# Patient Record
Sex: MALE | Race: BLACK OR AFRICAN AMERICAN | NOT HISPANIC OR LATINO | URBAN - METROPOLITAN AREA
[De-identification: names, ages, dates, MRNs, and addresses within clinical notes are randomized per-mention and may not be internally consistent; named-entity substitution may affect disease eponyms.]

---

## 2021-05-24 ENCOUNTER — RECORDS - HEALTHEAST (OUTPATIENT)
Dept: ADMINISTRATIVE | Facility: CLINIC | Age: 46
End: 2021-05-24

## 2021-05-25 ENCOUNTER — RECORDS - HEALTHEAST (OUTPATIENT)
Dept: ADMINISTRATIVE | Facility: CLINIC | Age: 46
End: 2021-05-25

## 2021-05-26 ENCOUNTER — RECORDS - HEALTHEAST (OUTPATIENT)
Dept: ADMINISTRATIVE | Facility: CLINIC | Age: 46
End: 2021-05-26

## 2021-05-27 ENCOUNTER — RECORDS - HEALTHEAST (OUTPATIENT)
Dept: ADMINISTRATIVE | Facility: CLINIC | Age: 46
End: 2021-05-27

## 2021-05-30 ENCOUNTER — RECORDS - HEALTHEAST (OUTPATIENT)
Dept: ADMINISTRATIVE | Facility: CLINIC | Age: 46
End: 2021-05-30

## 2021-05-31 ENCOUNTER — RECORDS - HEALTHEAST (OUTPATIENT)
Dept: ADMINISTRATIVE | Facility: CLINIC | Age: 46
End: 2021-05-31

## 2021-06-01 ENCOUNTER — RECORDS - HEALTHEAST (OUTPATIENT)
Dept: ADMINISTRATIVE | Facility: CLINIC | Age: 46
End: 2021-06-01

## 2021-06-03 ENCOUNTER — RECORDS - HEALTHEAST (OUTPATIENT)
Dept: ADMINISTRATIVE | Facility: CLINIC | Age: 46
End: 2021-06-03

## 2024-03-05 ENCOUNTER — NURSE TRIAGE (OUTPATIENT)
Dept: NURSING | Facility: CLINIC | Age: 49
End: 2024-03-05

## 2024-03-06 NOTE — TELEPHONE ENCOUNTER
"Nurse Triage SBAR    Is this a 2nd Level Triage? NO    Situation: muscle spasm in neck    Background: History of neck surgery, has scar close to area where spasm was.    Assessment: Patient is calling stating that he was on the phone twice tonight and during each conversation the patient had a muscle spasm/cramp in the right side of his neck, under his jaw that lasted very shortly.  Patient has never had anything like this happen before.    Protocol Recommended Disposition:   See PCP Within 2 Weeks    Recommendation: Home care advice given per protocol.  Recommend patient be seen by a provider within the next 2 weeks.  Patient's primary provider is with [facility].  He thinks he has a follow up with the neck surgeon coming up too.  Patient verbalized understanding the information provided.         Does the patient meet one of the following criteria for ADS visit consideration? No    Reason for Disposition   [1] Age > 50 AND [2] no history of prior similar neck pain    Additional Information   Negative: Shock suspected (e.g., cold/pale/clammy skin, too weak to stand, low BP, rapid pulse)   Negative: Difficult to awaken or acting confused (e.g., disoriented, slurred speech)   Negative: [1] Similar pain previously AND [2] it was from \"heart attack\"   Negative: [1] Similar pain previously AND [2] it was from \"angina\" AND [3] not relieved by nitroglycerin   Negative: Sounds like a life-threatening emergency to the triager   Negative: Difficulty breathing or unusual sweating (e.g., sweating without exertion)   Negative: [1] Stiff neck (can't put chin to chest) AND [2] headache   Negative: [1] Stiff neck (can't put chin to chest) AND [2] fever   Negative: Weakness of an arm or hand   Negative: Problems with bowel or bladder control   Negative: Head is twisting to one side (or ask \"is it turning against your will?\")   Negative: Patient sounds very sick or weak to the triager   Negative: [1] SEVERE neck pain (e.g., " "excruciating, unable to do any normal activities) AND [2] not improved after 2 hours of pain medicine   Negative: [1] Fever > 100.0 F (37.8 C) AND [2] Intravenous Drug Use (IVDU)   Negative: [1] Fever > 100.0 F (37.8 C) AND [2] diabetes mellitus or weak immune system (e.g., HIV positive, cancer chemo, splenectomy, organ transplant, chronic steroids)   Negative: Numbness in an arm or hand (i.e., loss of sensation)   Negative: Tenderness or swelling of front of neck over windpipe   Negative: Rash in same area as pain (may be described as \"small blisters\")   Negative: High-risk adult (e.g., history of cancer, HIV, or IV drug use)   Negative: [1] MODERATE neck pain (e.g., interferes with normal activities) AND [2] present > 3 days   Negative: Neck pain present > 2 weeks   Negative: Pain shoots (radiates) into arm or hand   Negative: Neck pain is a chronic symptom (recurrent or ongoing AND present > 4 weeks)    Protocols used: Neck Pain or [id number]    "

## 2025-03-23 ENCOUNTER — HOSPITAL ENCOUNTER (EMERGENCY)
Facility: CLINIC | Age: 50
Discharge: HOME | End: 2025-03-24
Attending: EMERGENCY MEDICINE | Admitting: EMERGENCY MEDICINE
Payer: MEDICAID

## 2025-03-23 DIAGNOSIS — E11.65 HYPERGLYCEMIA DUE TO DIABETES MELLITUS (H): ICD-10-CM

## 2025-03-23 DIAGNOSIS — R23.2 HOT FLASHES: ICD-10-CM

## 2025-03-23 DIAGNOSIS — B34.9 VIRAL SYNDROME: ICD-10-CM

## 2025-03-23 LAB
BASOPHILS # BLD AUTO: 0.1 10E3/UL (ref 0–0.2)
BASOPHILS NFR BLD AUTO: 0 %
EOSINOPHIL # BLD AUTO: 0.2 10E3/UL (ref 0–0.7)
EOSINOPHIL NFR BLD AUTO: 1 %
ERYTHROCYTE [DISTWIDTH] IN BLOOD BY AUTOMATED COUNT: 15.5 % (ref 10–15)
HCT VFR BLD AUTO: 39.3 % (ref 40–53)
HGB BLD-MCNC: 12.7 G/DL (ref 13.3–17.7)
IMM GRANULOCYTES # BLD: 0.1 10E3/UL
IMM GRANULOCYTES NFR BLD: 0 %
LYMPHOCYTES # BLD AUTO: 2.8 10E3/UL (ref 0.8–5.3)
LYMPHOCYTES NFR BLD AUTO: 21 %
MCH RBC QN AUTO: 23.3 PG (ref 26.5–33)
MCHC RBC AUTO-ENTMCNC: 32.3 G/DL (ref 31.5–36.5)
MCV RBC AUTO: 72 FL (ref 78–100)
MONOCYTES # BLD AUTO: 1.2 10E3/UL (ref 0–1.3)
MONOCYTES NFR BLD AUTO: 9 %
NEUTROPHILS # BLD AUTO: 9 10E3/UL (ref 1.6–8.3)
NEUTROPHILS NFR BLD AUTO: 68 %
NRBC # BLD AUTO: 0 10E3/UL
NRBC BLD AUTO-RTO: 0 /100
PLATELET # BLD AUTO: 272 10E3/UL (ref 150–450)
RBC # BLD AUTO: 5.46 10E6/UL (ref 4.4–5.9)
WBC # BLD AUTO: 13.3 10E3/UL (ref 4–11)

## 2025-03-23 PROCEDURE — 99284 EMERGENCY DEPT VISIT MOD MDM: CPT | Mod: 25

## 2025-03-23 PROCEDURE — 80053 COMPREHEN METABOLIC PANEL: CPT | Performed by: EMERGENCY MEDICINE

## 2025-03-23 PROCEDURE — 87637 SARSCOV2&INF A&B&RSV AMP PRB: CPT | Performed by: EMERGENCY MEDICINE

## 2025-03-23 PROCEDURE — 85025 COMPLETE CBC W/AUTO DIFF WBC: CPT | Performed by: EMERGENCY MEDICINE

## 2025-03-23 PROCEDURE — 81001 URINALYSIS AUTO W/SCOPE: CPT | Performed by: EMERGENCY MEDICINE

## 2025-03-23 PROCEDURE — 36415 COLL VENOUS BLD VENIPUNCTURE: CPT | Performed by: EMERGENCY MEDICINE

## 2025-03-23 ASSESSMENT — COLUMBIA-SUICIDE SEVERITY RATING SCALE - C-SSRS
2. HAVE YOU ACTUALLY HAD ANY THOUGHTS OF KILLING YOURSELF IN THE PAST MONTH?: NO
1. IN THE PAST MONTH, HAVE YOU WISHED YOU WERE DEAD OR WISHED YOU COULD GO TO SLEEP AND NOT WAKE UP?: NO
6. HAVE YOU EVER DONE ANYTHING, STARTED TO DO ANYTHING, OR PREPARED TO DO ANYTHING TO END YOUR LIFE?: NO

## 2025-03-24 ENCOUNTER — APPOINTMENT (OUTPATIENT)
Dept: GENERAL RADIOLOGY | Facility: CLINIC | Age: 50
End: 2025-03-24
Attending: EMERGENCY MEDICINE
Payer: MEDICAID

## 2025-03-24 VITALS
HEART RATE: 82 BPM | SYSTOLIC BLOOD PRESSURE: 130 MMHG | WEIGHT: 231.26 LBS | BODY MASS INDEX: 31.36 KG/M2 | RESPIRATION RATE: 18 BRPM | OXYGEN SATURATION: 99 % | DIASTOLIC BLOOD PRESSURE: 69 MMHG | TEMPERATURE: 97 F

## 2025-03-24 LAB
ALBUMIN SERPL BCG-MCNC: 4.1 G/DL (ref 3.5–5.2)
ALBUMIN UR-MCNC: 50 MG/DL
ALP SERPL-CCNC: 113 U/L (ref 40–150)
ALT SERPL W P-5'-P-CCNC: 26 U/L (ref 0–70)
ANION GAP SERPL CALCULATED.3IONS-SCNC: 12 MMOL/L (ref 7–15)
APPEARANCE UR: CLEAR
AST SERPL W P-5'-P-CCNC: 17 U/L (ref 0–45)
BASE EXCESS BLDV CALC-SCNC: 0.5 MMOL/L (ref -3–3)
BILIRUB SERPL-MCNC: 0.4 MG/DL
BILIRUB UR QL STRIP: NEGATIVE
BUN SERPL-MCNC: 12.6 MG/DL (ref 6–20)
CALCIUM SERPL-MCNC: 9.3 MG/DL (ref 8.8–10.4)
CHLORIDE SERPL-SCNC: 98 MMOL/L (ref 98–107)
COLOR UR AUTO: YELLOW
CREAT SERPL-MCNC: 1.14 MG/DL (ref 0.67–1.17)
EGFRCR SERPLBLD CKD-EPI 2021: 78 ML/MIN/1.73M2
FLUAV RNA SPEC QL NAA+PROBE: NEGATIVE
FLUBV RNA RESP QL NAA+PROBE: NEGATIVE
GLUCOSE SERPL-MCNC: 268 MG/DL (ref 70–99)
GLUCOSE UR STRIP-MCNC: >=1000 MG/DL
HCO3 BLDV-SCNC: 25 MMOL/L (ref 21–28)
HCO3 SERPL-SCNC: 24 MMOL/L (ref 22–29)
HGB UR QL STRIP: ABNORMAL
HOLD SPECIMEN: NORMAL
HOLD SPECIMEN: NORMAL
KETONES UR STRIP-MCNC: ABNORMAL MG/DL
LACTATE SERPL-SCNC: 1.1 MMOL/L (ref 0.7–2)
LACTATE SERPL-SCNC: 2.2 MMOL/L (ref 0.7–2)
LEUKOCYTE ESTERASE UR QL STRIP: NEGATIVE
MUCOUS THREADS #/AREA URNS LPF: PRESENT /LPF
NITRATE UR QL: NEGATIVE
O2/TOTAL GAS SETTING VFR VENT: 21 %
OXYHGB MFR BLDV: 83 % (ref 70–75)
PCO2 BLDV: 40 MM HG (ref 40–50)
PH BLDV: 7.41 [PH] (ref 7.32–7.43)
PH UR STRIP: 5.5 [PH] (ref 5–7)
PO2 BLDV: 50 MM HG (ref 25–47)
POTASSIUM SERPL-SCNC: 3.3 MMOL/L (ref 3.4–5.3)
PROT SERPL-MCNC: 7.8 G/DL (ref 6.4–8.3)
RBC URINE: 6 /HPF
RSV RNA SPEC NAA+PROBE: NEGATIVE
SAO2 % BLDV: 84.5 % (ref 70–75)
SARS-COV-2 RNA RESP QL NAA+PROBE: NEGATIVE
SODIUM SERPL-SCNC: 134 MMOL/L (ref 135–145)
SP GR UR STRIP: >1.035 (ref 1–1.03)
SQUAMOUS EPITHELIAL: <1 /HPF
UROBILINOGEN UR STRIP-MCNC: 2 MG/DL
WBC URINE: 2 /HPF

## 2025-03-24 PROCEDURE — 96360 HYDRATION IV INFUSION INIT: CPT

## 2025-03-24 PROCEDURE — 83605 ASSAY OF LACTIC ACID: CPT | Performed by: EMERGENCY MEDICINE

## 2025-03-24 PROCEDURE — 71046 X-RAY EXAM CHEST 2 VIEWS: CPT

## 2025-03-24 PROCEDURE — 258N000003 HC RX IP 258 OP 636: Performed by: EMERGENCY MEDICINE

## 2025-03-24 PROCEDURE — 36415 COLL VENOUS BLD VENIPUNCTURE: CPT | Performed by: EMERGENCY MEDICINE

## 2025-03-24 PROCEDURE — 82805 BLOOD GASES W/O2 SATURATION: CPT | Performed by: EMERGENCY MEDICINE

## 2025-03-24 RX ADMIN — SODIUM CHLORIDE 1000 ML: 9 INJECTION, SOLUTION INTRAVENOUS at 01:21

## 2025-03-24 ASSESSMENT — ACTIVITIES OF DAILY LIVING (ADL)
ADLS_ACUITY_SCORE: 41

## 2025-03-24 NOTE — ED PROVIDER NOTES
History     Chief Complaint:  Sweats       HPI   Tone Santana is a 49 year old male feels hot flashes and some myalgias headaches today.  Was around granddaughter this week that ended up with a fever.  No ear pain throat pain neck pain.  No CP SOB.  Slight cough and nasal congestion.  No abd pain NVD.  No dysuria hematuria.  Increased thirst and drinking water as well with borderline DM.   Has missed metformin last 2 days.  Tylenol last at 9pmish.        Independent Historian:        Review of External Notes:  Lots of refills for oxycodone.      Medications:    bacitracin 500 unit/gram ointment        Past Medical History:    No past medical history on file.    Past Surgical History:    Past Surgical History:   Procedure Laterality Date    NASAL SINUS SURGERY      polyps          Physical Exam   Patient Vitals for the past 24 hrs:   BP Temp Temp src Pulse Resp SpO2 Weight   03/24/25 0118 127/90 -- -- 82 -- 98 % --   03/24/25 0115 127/90 -- -- 85 -- 98 % --   03/23/25 2309 (!) 137/98 97  F (36.1  C) Temporal 96 18 98 % 104.9 kg (231 lb 4.2 oz)        Physical Exam  General: Patient is well appearing. No distress.  Drinking coca cola classic upon entering.    Head: Atraumatic.  Eyes: Conjunctivae and EOM are normal. No scleral icterus.  Ears normal  Throat normal.   Neck: Normal range of motion. Neck supple.  No meningismus.    Cardiovascular: Normal rate, regular rhythm, normal heart sounds and intact distal pulses.   Pulmonary/Chest: Breath sounds normal. No respiratory distress.  Abdominal: Soft. Bowel sounds are normal. No distension. No tenderness. No rebound or guarding.   Musculoskeletal: Normal range of motion.  Skin: Warm and dry. No rash noted. Not diaphoretic.      Emergency Department Course   ECG      Imaging:  XR Chest 2 Views   Final Result   IMPRESSION: The lungs are clear. There is no pleural effusion or pneumothorax. The cardiomediastinal silhouette is normal. The limited visualized portions  of the upper abdomen are grossly normal. Cervical postop changes.          Laboratory:  Labs Ordered and Resulted from Time of ED Arrival to Time of ED Departure   COMPREHENSIVE METABOLIC PANEL - Abnormal       Result Value    Sodium 134 (*)     Potassium 3.3 (*)     Carbon Dioxide (CO2) 24      Anion Gap 12      Urea Nitrogen 12.6      Creatinine 1.14      GFR Estimate 78      Calcium 9.3      Chloride 98      Glucose 268 (*)     Alkaline Phosphatase 113      AST 17      ALT 26      Protein Total 7.8      Albumin 4.1      Bilirubin Total 0.4     CBC WITH PLATELETS AND DIFFERENTIAL - Abnormal    WBC Count 13.3 (*)     RBC Count 5.46      Hemoglobin 12.7 (*)     Hematocrit 39.3 (*)     MCV 72 (*)     MCH 23.3 (*)     MCHC 32.3      RDW 15.5 (*)     Platelet Count 272      % Neutrophils 68      % Lymphocytes 21      % Monocytes 9      % Eosinophils 1      % Basophils 0      % Immature Granulocytes 0      NRBCs per 100 WBC 0      Absolute Neutrophils 9.0 (*)     Absolute Lymphocytes 2.8      Absolute Monocytes 1.2      Absolute Eosinophils 0.2      Absolute Basophils 0.1      Absolute Immature Granulocytes 0.1      Absolute NRBCs 0.0     ROUTINE UA WITH MICROSCOPIC REFLEX TO CULTURE - Abnormal    Color Urine Yellow      Appearance Urine Clear      Glucose Urine >=1000 (*)     Bilirubin Urine Negative      Ketones Urine Trace (*)     Specific Gravity Urine >1.035 (*)     Blood Urine Small (*)     pH Urine 5.5      Protein Albumin Urine 50 (*)     Urobilinogen Urine 2.0      Nitrite Urine Negative      Leukocyte Esterase Urine Negative      Mucus Urine Present (*)     RBC Urine 6 (*)     WBC Urine 2      Squamous Epithelials Urine <1     BLOOD GAS VENOUS - Abnormal    pH Venous 7.41      pCO2 Venous 40      pO2 Venous 50 (*)     Bicarbonate Venous 25      Base Excess/Deficit Venous 0.5      FIO2 21      Oxyhemoglobin Venous 83 (*)     O2 Sat, Venous 84.5 (*)    LACTIC ACID WHOLE BLOOD WITH 1X REPEAT IN 2 HR WHEN >2 -  Abnormal    Lactic Acid, Initial 2.2 (*)    INFLUENZA A/B, RSV AND SARS-COV2 PCR - Normal    Influenza A PCR Negative      Influenza B PCR Negative      RSV PCR Negative      SARS CoV2 PCR Negative     LACTIC ACID WHOLE BLOOD        Procedures       Emergency Department Course & Assessments:    Interventions:  Medications   sodium chloride 0.9% BOLUS 1,000 mL (0 mLs Intravenous Stopped 3/24/25 5414)        Assessments:  After IVF feels resolved.      Independent Interpretation (X-rays, CTs, rhythm strip):  CXR no infiltrates masses effusions.      Consultations/Discussion of Management or Tests:         Social Drivers of Health affecting care:       Disposition:  The patient was discharged.  Pending same or improved lactate.      Impression & Plan           Medical Decision Making:  Pt is uncontrolled DM by labs but not brenton DKA or severely metabolically deranged.  Some feelings of hot flashes but no fever here.  Slight elev WBC and diff with no findings ENT chest and abd benign no red flags.  UA shows uncontrolled DM with gluc spillover concentrated but no brenton indications of UTI in this setting.  After IVF feels resolved.  Additionally lactate expected to normalized.  Needs urgent follow up for likely tighter DM control and recheck.  Strict return and follow up discussed.      Diagnosis:    ICD-10-CM    1. Hyperglycemia due to diabetes mellitus (H)  E11.65       2. Hot flashes  R23.2       3. Viral syndrome  B34.9            Discharge Medications:  New Prescriptions    No medications on file                 Dmitri Fragoso MD  03/24/25 3170

## 2025-03-24 NOTE — ED TRIAGE NOTES
Patient c/o increased sweating and hot flashes that started earlier today. Also endorses headache. Denies any cough, fever, chills, nausea or vomiting. ABCs intact. VSS.